# Patient Record
Sex: FEMALE | URBAN - METROPOLITAN AREA
[De-identification: names, ages, dates, MRNs, and addresses within clinical notes are randomized per-mention and may not be internally consistent; named-entity substitution may affect disease eponyms.]

---

## 2018-10-19 ENCOUNTER — OFFICE VISIT (OUTPATIENT)
Dept: URBAN - METROPOLITAN AREA CLINIC 76 | Facility: CLINIC | Age: 78
End: 2018-10-19
Payer: MEDICARE

## 2018-10-19 DIAGNOSIS — H01.021 SQUAMOUS BLEPHARITIS RIGHT UPPER EYELID: ICD-10-CM

## 2018-10-19 DIAGNOSIS — B02.39 OTHER HERPES ZOSTER EYE DISEASE: Primary | ICD-10-CM

## 2018-10-19 PROCEDURE — 92004 COMPRE OPH EXAM NEW PT 1/>: CPT | Performed by: OPTOMETRIST

## 2018-10-19 RX ORDER — NEOMYCIN SULFATE, POLYMYXIN B SULFATE AND DEXAMETHASONE 3.5; 10000; 1 MG/G; [USP'U]/G; MG/G
OINTMENT OPHTHALMIC
Qty: 1 | Refills: 1 | Status: INACTIVE
Start: 2018-10-19 | End: 2021-08-16

## 2018-10-19 RX ORDER — ACYCLOVIR 400 MG/1
400 MG TABLET ORAL
Qty: 60 | Refills: 6 | Status: ACTIVE
Start: 2018-10-19

## 2018-10-19 ASSESSMENT — INTRAOCULAR PRESSURE
OS: 11
OD: 11

## 2018-10-19 NOTE — IMPRESSION/PLAN
Impression: Age-related nuclear cataract, bilateral: H25.13. Plan: Discussed condition. Continue to monitor without treatment at this time.

## 2018-10-19 NOTE — IMPRESSION/PLAN
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 Plan: Discussed diagnosis in detail with patient. Start MAxitrol ointment BID OU, apply to lash line. Continue tears, Systane Complete.

## 2018-11-05 ENCOUNTER — OFFICE VISIT (OUTPATIENT)
Dept: URBAN - METROPOLITAN AREA CLINIC 76 | Facility: CLINIC | Age: 78
End: 2018-11-05
Payer: MEDICARE

## 2018-11-05 DIAGNOSIS — H52.4 PRESBYOPIA: ICD-10-CM

## 2018-11-05 PROCEDURE — 99213 OFFICE O/P EST LOW 20 MIN: CPT | Performed by: OPTOMETRIST

## 2018-11-05 RX ORDER — AMOXICILLIN AND CLAVULANATE POTASSIUM 500; 125 MG/1; 1/1
TABLET, FILM COATED ORAL
Qty: 15 | Refills: 0 | Status: ACTIVE
Start: 2018-11-05

## 2018-11-05 ASSESSMENT — KERATOMETRY
OD: 45.25
OS: 44.88

## 2018-11-05 ASSESSMENT — VISUAL ACUITY
OS: 20/40
OD: 20/70

## 2018-11-05 ASSESSMENT — INTRAOCULAR PRESSURE
OS: 11
OD: 10

## 2018-11-05 NOTE — IMPRESSION/PLAN
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 Plan: Discussed diagnosis in detail with patient. Continue Maxitrol ointment TID OU, apply to lash line. Continue tears, Systane Complete and focal heat TID OU. Start Augmentin 500mg twice a day for 2 weeks.

## 2018-11-05 NOTE — IMPRESSION/PLAN
Impression: Chalazion right upper eyelid: H00.11. OD. Plan: Discussed diagnosis in detail with patient. Recommend Consult with Dr. Lux Barber as current treatment is not helping.

## 2018-11-20 ENCOUNTER — OFFICE VISIT (OUTPATIENT)
Dept: URBAN - METROPOLITAN AREA CLINIC 76 | Facility: CLINIC | Age: 78
End: 2018-11-20
Payer: MEDICARE

## 2018-11-20 DIAGNOSIS — H00.11 CHALAZION RIGHT UPPER EYELID: Primary | ICD-10-CM

## 2018-11-20 PROCEDURE — 99213 OFFICE O/P EST LOW 20 MIN: CPT | Performed by: OPTOMETRIST

## 2018-11-20 ASSESSMENT — INTRAOCULAR PRESSURE
OS: 11
OD: 11

## 2018-11-20 NOTE — IMPRESSION/PLAN
Impression: Chalazion right upper eyelid: H00.11. OD. not improving w/ warm compresses and massage, oral antibiotic. Plan: Discussed diagnosis in detail with patient. Recommend Consult with Dr. Chidi Barry as current treatment is not helping.   Pt would like surgical removal.

## 2021-08-16 ENCOUNTER — OFFICE VISIT (OUTPATIENT)
Dept: URBAN - METROPOLITAN AREA CLINIC 76 | Facility: CLINIC | Age: 81
End: 2021-08-16
Payer: MEDICARE

## 2021-08-16 DIAGNOSIS — H17.89 OTHER CORNEAL SCARS AND OPACITIES: ICD-10-CM

## 2021-08-16 DIAGNOSIS — H25.13 AGE-RELATED NUCLEAR CATARACT, BILATERAL: Primary | ICD-10-CM

## 2021-08-16 PROCEDURE — 92014 COMPRE OPH EXAM EST PT 1/>: CPT | Performed by: OPTOMETRIST

## 2021-08-16 ASSESSMENT — VISUAL ACUITY
OD: 20/60
OS: 20/40

## 2021-08-16 ASSESSMENT — INTRAOCULAR PRESSURE
OS: 9
OD: 8

## 2021-08-16 ASSESSMENT — KERATOMETRY
OD: 47.38
OS: 45.38

## 2021-08-16 NOTE — IMPRESSION/PLAN
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 Plan: Stable, observe.

## 2023-06-13 ENCOUNTER — OFFICE VISIT (OUTPATIENT)
Dept: URBAN - METROPOLITAN AREA CLINIC 76 | Facility: CLINIC | Age: 83
End: 2023-06-13
Payer: MEDICARE

## 2023-06-13 DIAGNOSIS — H10.45 OTHER CHRONIC ALLERGIC CONJUNCTIVITIS: Primary | ICD-10-CM

## 2023-06-13 PROCEDURE — 99213 OFFICE O/P EST LOW 20 MIN: CPT | Performed by: OPTOMETRIST

## 2023-06-13 ASSESSMENT — INTRAOCULAR PRESSURE: OS: 10

## 2023-06-13 NOTE — IMPRESSION/PLAN
Impression: Other chronic allergic conjunctivitis: H10.45. Plan: Discussed diagnosis with patient. Recommend OTC oral antihistamine. Start Pataday 0.7% QD OU. Rub excess into lids. Recommend refrigerating drops. Recommend to start using PF Systane QID OU.